# Patient Record
(demographics unavailable — no encounter records)

---

## 2024-11-06 NOTE — PHYSICAL EXAM
[General Appearance - Well Developed] : well developed [Edema] : no peripheral edema [] : no respiratory distress [Abdomen Soft] : soft [Urinary Bladder Findings] : the bladder was normal on palpation [Normal Station and Gait] : the gait and station were normal for the patient's age [No Focal Deficits] : no focal deficits

## 2024-11-06 NOTE — ASSESSMENT
[FreeTextEntry1] : plan repeat PSA august 2024 repeat MRI 2024  I had a discussion with the patient regarding the implication of an elevated PSA and the need for further evaluation with a multiparametric MRI of the prostate with 3D mapping to facilitate subsequent fusion biopsy.    If suspicious areas are noted, the patient will need a Uronav (meaning fusion MRI-US biopsies) to biopsy the prostate using the transperineal approach.

## 2024-11-06 NOTE — HISTORY OF PRESENT ILLNESS
[FreeTextEntry1] : ISHAAN MOTTA is a 53 year old gentleman w/ hx of head injury when 13 after a fall, DM, BPH (recently switched to alfusozin), DM presenting with elevated PSA. His PSA was 5.23. His historical PSAs are listed below. Pt has had a prostate mri showing Prostate Volume 51 cc with PIRADS 1. No lesion measuring No LAD No EPE, No Bony Lesions. Never had prostate biopsy. Never smoker. Unknown family history. LUTS: good stream, nocturia x0, retention sometimes, straining sometimes. PSA Hx: 03/11/2024: 5.23 NG/ML - %free PSA = 11% 4.24 NG/ML 06/21/2023 4.71 NG/ML 04/13/2023    MRI @[facility] 08/10/2023. Prostate Volume 51 cc with PIRADS 1. No lesion measuring No LAD No EPE, No Bony Lesions. The clinical implications have been discussed with the patient. Select MDx: 44% likelihood of Ca upon biopsy / 17% likelihood of Gl 7 or above.  nov 2024: PSA=4.94 / MRI prostate (09/24): PS=48cc / no lesions / PSAD=0.10

## 2024-11-20 NOTE — HISTORY OF PRESENT ILLNESS
[FreeTextEntry1] : Here with aide. Would like to d.c injections  Follow up DM, Hyperlipidemia and Vitamin D deficiency. PMH of TBI, intellectual disability, seizure disorder, NAFLD. Currently resides at group home.  Quality: Type 2 DM Severity: moderate Duration of diabetes: since 11/2012 Associated Complications/ Symptoms: no known microvascular complications.  Modifying Factors: Better with medication  SMBG: no SMBG at home. Pt wouldn't comply.  FS in office 215- spaghetti and coffee cake  His aide thinks a sensor isnt a good idea for him (knows him well)  HGA1C 7.5-10/2024- small improvement  Current Diabetic Medication Regimen: Ozempic 1 mg weekly  Pioglitazone 15 mg daily  (metformin avoided due to elevated LFTs in the past)  + moderate proteinuria   Statin was D/C'ed again due to elevated LFTs   Aide states he follows with a heptologist at [facility]

## 2024-11-20 NOTE — ASSESSMENT
[FreeTextEntry1] : 53 year old male with PMH of TBI, seizure disorder, Type 2 DM, hyperlipidemia, Vitamin D deficiency and NAFLD here for follow up.  1. Type 2 DM- Continue Ozempic 1 mg weekly.  Continue Pioglitazone. Begin Jardiance 10 mg daily (for DM and proteinuria), drink plenty of water. Encourage more movement and better food choices. Pt does not like the injections but will compromise and continue them since it is once a week. Nurse/aide both endorse he has a lot of room for improvement with diet/drink choices (but is doing better!) 2. Hyperlipidemia- Atorvastatin and crestor D/C'ed due to elevated LFTs in past but will again try Crestor 5 mg daily and will repeat CMP for liver enzymes one week after starting. Pt must follow up with hepatologist.  3. Vit d levels slightly low on labs- continue daily supplement   Follow up in 8 weeks NP, 4 months with MD

## 2024-11-20 NOTE — REVIEW OF SYSTEMS
[Fatigue] : no fatigue [Recent Weight Gain (___ Lbs)] : no recent weight gain [Recent Weight Loss (___ Lbs)] : recent weight loss: [unfilled] lbs [Visual Field Defect] : no visual field defect [Blurred Vision] : no blurred vision [Dysphagia] : no dysphagia [Neck Pain] : no neck pain [Dysphonia] : no dysphonia [Chest Pain] : no chest pain [Shortness Of Breath] : no shortness of breath [Constipation] : no constipation [Diarrhea] : no diarrhea [Hesistancy] : hesitancy [Polydipsia] : no polydipsia

## 2025-02-13 NOTE — ASSESSMENT
[FreeTextEntry1] : Feb 2025: PSA=4.94 (06/2024)/ MRI prostate (09/24): PS=48cc / no lesions / PSAD=0.10    plan repeat PSA june 2025 reassure for now continue PSA surveillance as PSA hasn't moved in 2 years  All images and labs were reviewed and explained thoroughly All the patient's questions were answered to the best of my ability.

## 2025-02-13 NOTE — HISTORY OF PRESENT ILLNESS
[FreeTextEntry1] : ISHAAN MOTTA is a 53 year old gentleman w/ hx of head injury when 13 after a fall, DM, BPH (recently switched to alfusozin), DM presenting with elevated PSA. His PSA was 5.23. His historical PSAs are listed below. Pt has had a prostate mri showing Prostate Volume 51 cc with PIRADS 1. No lesion measuring No LAD No EPE, No Bony Lesions. Never had prostate biopsy. Never smoker. Unknown family history. LUTS: good stream, nocturia x0, retention sometimes, straining sometimes. PSA Hx: 03/11/2024: 5.23 NG/ML - %free PSA = 11% 4.24 NG/ML 06/21/2023 4.71 NG/ML 04/13/2023  MRI @[facility] 08/10/2023. Prostate Volume 51 cc with PIRADS 1. No lesion measuring No LAD No EPE, No Bony Lesions. The clinical implications have been discussed with the patient. Select MDx: 44% likelihood of Ca upon biopsy / 17% likelihood of Gl 7 or above.  Feb 2025: PSA=4.94 (06/2024)/ MRI prostate (09/24): PS=48cc / no lesions / PSAD=0.10

## 2025-03-06 NOTE — HISTORY OF PRESENT ILLNESS
[FreeTextEntry1] : Here with aide. Pt doing well  Follow up DM, Hyperlipidemia and Vitamin D deficiency. PMH of TBI, intellectual disability, seizure disorder, NAFLD. Currently resides at group home.  Quality: Type 2 DM Severity: moderate Duration of diabetes: since 11/2012 Associated Complications/ Symptoms: no known microvascular complications.  Modifying Factors: Better with medication  SMBG: no SMBG at home. Pt wouldn't comply.  FS in office 144- had coffee with milk and splenda several hours ago His aide thinks a sensor isnt a good idea for him (knows him well)  HGA1C 7.5-10/2024- small improvement Due for updated HGA1C   Current Diabetic Medication Regimen: Ozempic 1 mg weekly  Jardiance 10 mg daily Pioglitazone 15 mg daily  (metformin avoided due to elevated LFTs in the past)  + moderate proteinuria   Statin was D/C'ed again due to elevated LFTs   Aide states he follows with a heptologist at [facility]

## 2025-03-06 NOTE — ASSESSMENT
[FreeTextEntry1] : 54 year old male with PMH of TBI, seizure disorder, Type 2 DM, hyperlipidemia, Vitamin D deficiency and NAFLD here for follow up.  1. Type 2 DM- Continue Ozempic 1 mg weekly.  Continue Pioglitazone. Continue Jardiance 10 mg daily (for DM and proteinuria), drink plenty of water. Encourage more movement and better food choices. Pt does not like the injections but will compromise and continue them since it is once a week. Nurse/aide both endorse he has a lot of room for improvement with diet/drink choices (but is doing better!) 2. Hyperlipidemia- All statins d.dennis due to elevated LFTs. Pt may need cardiac risk assessment if cholesterol comes back high on labs 3. Vit d Def- need updated levels on labs- continue daily supplement   Fasting labs due now and again in 3 months  Follow up in 3 months with MD

## 2025-03-06 NOTE — REVIEW OF SYSTEMS
[Fatigue] : no fatigue [Recent Weight Gain (___ Lbs)] : no recent weight gain [Recent Weight Loss (___ Lbs)] : recent weight loss: [unfilled] lbs [Visual Field Defect] : no visual field defect [Blurred Vision] : no blurred vision [Dysphagia] : no dysphagia [Neck Pain] : no neck pain [Dysphonia] : no dysphonia [Chest Pain] : no chest pain [Shortness Of Breath] : no shortness of breath [Constipation] : no constipation [Diarrhea] : no diarrhea [Polyuria] : no polyuria [Polydipsia] : no polydipsia [FreeTextEntry8] : hesitancy- followed by urology

## 2025-06-26 NOTE — ASSESSMENT
[FreeTextEntry1] : 54M, from a group home, with PMH of TBI, seizure disorder, T2DM, hyperlipidemia, Vitamin D deficiency and NAFLD here for follow up. rtc in 6months with np rtc in 1 year check igf1 given increased facial soft tissue sees dnan Nunez  T2DM- a1c stable. goal a1c <6.5 -does not want to check FS -unable to alter or restrict diet though educated the patient again. needs to cut back on the candy bars -also needs to try to walk more -continue ozempic 1mg weekly  -continue jardiance 10mg daily -continue actos 15mg daily for fatty liver disease -if a1c worsens, can consider increasing glp1 vs switching to mounjaro vs increasing sglt2 -annual visit to eye doctor  Hyperlipidemia- Atorvastatin D/C'ed due to elevated LFTs on multiple occasions. on omega 3. ldl not at goal. should see cardiology  Elevated LFTs- seeing GI, will get records

## 2025-06-26 NOTE — HISTORY OF PRESENT ILLNESS
[FreeTextEntry1] : here for T2DM, Hyperlipidemia and Vitamin D deficiency group home resident PMH of TBI, intellectual disability, seizure disorder, NAFLD  Quality:  Type 2 DM Severity:  moderate Duration of diabetes:  since 11/2012 Associated Complications/ Symptoms:  no known microvascular complications.  Modifying Factors:  Better with medication  interval history last seen by me 8/2024, has been seeing NP chart reviewed labs reviewed- 6/2025 a1c 6.6, lipids off with ldl 116, mihaela neg, vit d 35, normal tfts, ast/alt 39/65 stable  here with medical counselor, jessy nichols by Francia(s)Donnell (patient's nickname) does not have a restricted diet bc high functioning does not like needles does not want to have fingersticks checked at group home   regimen: ozempic 1mg weekly- denied any constipation jardiance 10mg daily Pioglitazone 15 mg daily - due to fatty liver disease past meds metformin avoided due to elevated LFTs in the past statin previously dced due to elevated LFTs but has crestor    off trulicity bc no appetite suppression  FS in office 190, had rice before the visit

## 2025-06-27 NOTE — HISTORY OF PRESENT ILLNESS
[FreeTextEntry1] : here for T2DM, Hyperlipidemia and Vitamin D deficiency group home resident PMH of TBI, intellectual disability, seizure disorder, NAFLD  Quality:  Type 2 DM Severity:  moderate Duration of diabetes:  since 11/2012 Associated Complications/ Symptoms:  no known microvascular complications.  Modifying Factors:  Better with medication  interval history last seen by me 8/2024, has been seeing NP chart reviewed labs reviewed- 6/2025 a1c 6.6, lipids off with ldl 116, mihaela neg, vit d 35, normal tfts, ast/alt 39/65 stable  here with medical counselor, jessy nichols by Francia(s)Donnell (patient's nickname), goes by Gregory does not have a restricted diet bc high functioning does not like needles does not want to have fingersticks checked at group home  had multiple questions asked about the sensor lost about 14 lbs since being on glp1  regimen: ozempic 1mg weekly- denied any constipation jardiance 10mg daily Pioglitazone 15 mg daily - due to fatty liver disease past meds metformin avoided due to elevated LFTs in the past statin previously dced due to elevated LFTs but has crestor    off trulicity bc no appetite suppression  FS in office :123  eye doctor: oma aviles DR 2025

## 2025-06-27 NOTE — ASSESSMENT
[FreeTextEntry1] : 54M, from a group home, with PMH of TBI, seizure disorder, T2DM, hyperlipidemia, Vitamin D deficiency and NAFLD here for follow up. rtc in 6months with np rtc in 1 year sees dann Nunez  T2DM- a1c stable. goal a1c <6.5 -does not want to check FS -continue diet and exercise -continue ozempic 1mg weekly  -continue jardiance 10mg daily -continue actos 15mg daily for fatty liver disease -if a1c worsens, can consider increasing glp1 vs switching to mounjaro vs increasing sglt2 -annual visit to eye doctor  Hyperlipidemia- Atorvastatin D/C'ed due to elevated LFTs on multiple occasions. on omega 3. ldl not at goal. seeing cardiology  Elevated LFTs- improve on actos, seeing GI, will get records

## 2025-07-06 NOTE — PHYSICAL EXAM
[General Appearance - Well Developed] : well developed [Edema] : no peripheral edema [] : no respiratory distress [Urinary Bladder Findings] : the bladder was normal on palpation [Abdomen Soft] : soft [Normal Station and Gait] : the gait and station were normal for the patient's age [No Focal Deficits] : no focal deficits

## 2025-07-07 NOTE — HISTORY OF PRESENT ILLNESS
[FreeTextEntry1] : ISHAAN MOTTA is a 54-year-old gentleman w/ hx of head injury when 13 after a fall, DM, BPH (recently switched to alfusozin), DM presenting with elevated PSA. His PSA was 5.23. His historical PSAs are listed below. Pt has had a prostate mri showing Prostate Volume 51 cc with PIRADS 1. No lesion measuring No LAD No EPE, No Bony Lesions. Never had prostate biopsy. Never smoker. Unknown family history. LUTS: good stream, nocturia x0, retention sometimes, straining sometimes. PSA Hx: 03/11/2024: 5.23 NG/ML - %free PSA = 11% 4.24 NG/ML 06/21/2023 4.71 NG/ML 04/13/2023  MRI @[facility] 08/10/2023. Prostate Volume 51 cc with PIRADS 1. No lesion measuring No LAD No EPE, No Bony Lesions. The clinical implications have been discussed with the patient. Select MDx: 44% likelihood of Ca upon biopsy / 17% likelihood of Gl 7 or above.  Feb 2025: PSA=4.94 (06/2024) / MRI prostate (09/24): PS=48cc / no lesions / PSAD=0.10  July 2025: PSA = 5.35 // %free = 10%

## 2025-07-07 NOTE — HISTORY OF PRESENT ILLNESS
[FreeTextEntry1] : ISHAAN MOTTA is a 54-year-old gentleman w/ hx of head injury when 13 after a fall, DM, BPH (recently switched to alfusozin), DM presenting with elevated PSA. His PSA was 5.23. His historical PSAs are listed below. Pt has had a prostate mri showing Prostate Volume 51 cc with PIRADS 1. No lesion measuring No LAD No EPE, No Bony Lesions. Never had prostate biopsy. Never smoker. Unknown family history. LUTS: good stream, nocturia x0, retention sometimes, straining sometimes. PSA Hx: 03/11/2024: 5.23 NG/ML - %free PSA = 11% 4.24 NG/ML 06/21/2023 4.71 NG/ML 04/13/2023  MRI @[facility] 08/10/2023. Prostate Volume 51 cc with PIRADS 1. No lesion measuring No LAD No EPE, No Bony Lesions. The clinical implications have been discussed with the patient. Select MDx: 44% likelihood of Ca upon biopsy / 17% likelihood of Gl 7 or above.  Feb 2025: PSA=4.94 (06/2024) / MRI prostate (09/24): PS=48cc / no lesions / PSAD=0.10  July 2025: PSA = 5.35 // %free = 10%

## 2025-07-07 NOTE — ASSESSMENT
[FreeTextEntry1] : Feb 2025: PSA=4.94 (06/2024)/ MRI prostate (09/24): PS=48cc / no lesions / PSAD=0.10  July 2025: PSA = 5.35 // %free = 10%   plan repeat PSA dec 2025 reassure for now continue PSA surveillance as PSA hasn't moved in 2 years  All images and labs were reviewed and explained thoroughly All the patient's questions were answered to the best of my ability.